# Patient Record
Sex: MALE | Race: WHITE
[De-identification: names, ages, dates, MRNs, and addresses within clinical notes are randomized per-mention and may not be internally consistent; named-entity substitution may affect disease eponyms.]

---

## 2018-06-27 ENCOUNTER — HOSPITAL ENCOUNTER (EMERGENCY)
Dept: HOSPITAL 25 - UCCORT | Age: 63
Discharge: HOME | End: 2018-06-27
Payer: MEDICAID

## 2018-06-27 VITALS — DIASTOLIC BLOOD PRESSURE: 89 MMHG | SYSTOLIC BLOOD PRESSURE: 146 MMHG

## 2018-06-27 DIAGNOSIS — M10.9: Primary | ICD-10-CM

## 2018-06-27 DIAGNOSIS — E11.65: ICD-10-CM

## 2018-06-27 DIAGNOSIS — Z87.891: ICD-10-CM

## 2018-06-27 LAB
BASOPHILS # BLD AUTO: 0 10^3/UL (ref 0–0.2)
EOSINOPHIL # BLD AUTO: 0.1 10^3/UL (ref 0–0.6)
HCT VFR BLD AUTO: 46 % (ref 42–52)
HGB BLD-MCNC: 16.3 G/DL (ref 14–18)
LYMPHOCYTES # BLD AUTO: 1.1 10^3/UL (ref 1–4.8)
MCH RBC QN AUTO: 32 PG (ref 27–31)
MCHC RBC AUTO-ENTMCNC: 36 G/DL (ref 31–36)
MCV RBC AUTO: 91 FL (ref 80–94)
MONOCYTES # BLD AUTO: 0.4 10^3/UL (ref 0–0.8)
NEUTROPHILS # BLD AUTO: 3.5 10^3/UL (ref 1.5–7.7)
NRBC # BLD AUTO: 0 10^3/UL
NRBC BLD QL AUTO: 0.2
PLATELET # BLD AUTO: 160 10^3/UL (ref 150–450)
RBC # BLD AUTO: 5.03 10^6/UL (ref 4–5.4)
URATE SERPL-MCNC: 4 MG/DL (ref 4.4–7.6)
WBC # BLD AUTO: 5.1 10^3/UL (ref 3.5–10.8)

## 2018-06-27 PROCEDURE — 80053 COMPREHEN METABOLIC PANEL: CPT

## 2018-06-27 PROCEDURE — 85025 COMPLETE CBC W/AUTO DIFF WBC: CPT

## 2018-06-27 PROCEDURE — 99212 OFFICE O/P EST SF 10 MIN: CPT

## 2018-06-27 PROCEDURE — 36415 COLL VENOUS BLD VENIPUNCTURE: CPT

## 2018-06-27 PROCEDURE — G0463 HOSPITAL OUTPT CLINIC VISIT: HCPCS

## 2018-06-27 PROCEDURE — 84550 ASSAY OF BLOOD/URIC ACID: CPT

## 2018-06-27 NOTE — UC
Lower Extremity/Ankle HPI





- HPI Summary


HPI Summary: 





left foot pain over the metatarsals dorsally. Atraumatic. No pcp. He is not 

aware of being a diabetic. No fever or chills. He quit tob in the 70s. He is 

retired. He had gout many years ago. It has been swollen and tender since 

friday and is getting worse. 





- History of Current Complaint


Chief Complaint: UCLowerExtremity


Stated Complaint: LEFT FOOT COMPLAINT


Time Seen by Provider: 06/27/18 08:56


Hx Obtained From: Patient, Family/Caretaker


Onset/Duration: Gradual Onset, Lasting Days


Severity Initially: Moderate


Severity Currently: Severe


Pain Intensity: 8


Aggravating Factor(s): Standing, Ambulation


Alleviating Factor(s): Rest, Elevation


Able to Bear Weight: No





- Allergies/Home Medications


Allergies/Adverse Reactions: 


 Allergies











Allergy/AdvReac Type Severity Reaction Status Date / Time


 


No Known Allergies Allergy   Verified 05/18/17 15:04














PMH/Surg Hx/FS Hx/Imm Hx


Previously Healthy: No - gout. prior smoker. 





- Surgical History


Surgical History: Yes


Surgery Procedure, Year, and Place: LEFT WRIST FX.  Rt SHOULDER - RTC AND BICEP 

INSERTION - [facility]





- Family History


Known Family History: Positive: None





- Social History


Occupation: Retired


Lives: With Family


Alcohol Use: Daily


Alcohol Amount: 1-2 BEERS PER DAY


Substance Use Type: None


Smoking Status (MU): Former Smoker


Amount Used/How Often: 1 PPD X <10 YEARS


When Did the Patient Quit Smoking/Using Tobacco: 1978





- Immunization History


Most Recent Tetanus Shot: UNKNOWN





Review of Systems


Constitutional: Negative


Musculoskeletal: Arthralgia


All Other Systems Reviewed And Are Negative: Yes





Physical Exam


Triage Information Reviewed: Yes


Appearance: Well-Appearing, No Pain Distress, Well-Nourished


Vital Signs: 


 Initial Vital Signs











Temp  98.7 F   06/27/18 08:23


 


Pulse  94   06/27/18 08:23


 


Resp  16   06/27/18 08:23


 


BP  146/89   06/27/18 08:23


 


Pulse Ox  99   06/27/18 08:23











Vital Signs Reviewed: Yes


Eyes: Positive: Conjunctiva Clear


ENT: Positive: Normal ENT inspection


Neck: Positive: Supple, Nontender, No Lymphadenopathy


Respiratory: Negative: Respiratory distress, Accessory muscle use


Cardiovascular: Positive: Brisk Capillary Refill, Other: - marli post tibialis 

pulses 2+


Abdomen Description: Negative: Distended


Musculoskeletal Exam: Other - swelling, pinkness, tenderness diffusely over the 

left foot. Mainly over the 1st metatarsal. Mild swelling and tenderness over 

the 1st MTP joint as well. No streaking or fluctuance.


Neurological: Positive: Alert, Muscle Tone Normal.  Negative: Fatigued


Psychological: Positive: Normal Response To Family, Age Appropriate Behavior


Skin: Negative: rashes





Lower Extremity Course/Dx





- Course


Course Of Treatment: No pcp, we wonder about undiagnosed DM. No signs of 

vascular compromise. This is most c/w with gout at this point. We will be 

careful with meds as we do not know his renal status. He agrees to f/u with new 

pcp. Pcp list given to pt and his wife. As he has no immediate follow up at 

this point we will also add antibiotics for the possibility of infection. 

Random BG is 280s. he may have DM. X ray does not show charcot foot or 

metastatic disease or pathologic fracture.





- Differential Dx/Diagnosis


Provider Diagnoses: gout.  foot pain and swelling.  possible foot cellulitis.





Discharge





- Sign-Out/Discharge


Documenting (check all that apply): Discharge/Admit/Transfer





- Discharge Plan


Condition: Good


Disposition: HOME


Prescriptions: 


Colchicine* [Colcrys*] 0.6 mg PO BID #2 tab


Naproxen [Naproxen 500 mg tab] 500 mg PO BID #10 tablet.


Sulfamethox/Trimethoprim DS* [Bactrim /160 TAB*] 1 tab PO BID #20 tab


Patient Education Materials:  Swollen Joint (ED), Arthralgia (ED), 

Metatarsalgia (DC)


Referrals: 


No Primary Care Phys,NOPCP [Primary Care Provider] - 


Additional Instructions: 


Go to the ED for any worsening symptoms. You need a primary care doctor. Use 

the paper I have given you to find one today. 





- Billing Disposition and Condition


Condition: GOOD


Disposition: Home

## 2018-06-27 NOTE — RAD
Indication: LEFT foot pain, swelling and numbness.



Comparison: No relevant prior exams available on the [facility] [place], [place] PACS for comparison.



Technique: AP, lateral, and oblique views LEFT foot. 



Report: Negative for fracture or malalignment. Polyarticular osteoarthritis moderate in

severity at the articulation between the bases of the first and second metatarsals and at

the first metatarsal phalangeal joint. Moderate Achilles tendon insertion bone spur and

soft tissue swelling at the level of the insertional segment of the Achilles tendon.

Additionally there is diffuse soft tissue swelling about the forefoot most prominent over

the dorsal and plantar aspects. No conspicuous foreign body or subcutaneous emphysema.



IMPRESSION: 

#.  Osteoarthritis 

#.  Correlate for potential acute component of insertional Achilles tendinopathy 

#.  Nonspecific forefoot soft tissue swelling

## 2018-06-28 NOTE — UC
- Progress Note


Progress Note: 





gluccose 330 --- he has uncontrolled diabetes and needs PCP ASAP -- I advise to 

go to emergency room for further labs to ensure the glucose not worsened as if 

it gets much higher can put patient in to diabetic coma 








Discharge





- Sign-Out/Discharge


Documenting (check all that apply): Discharge/Admit/Transfer





- Discharge Plan


Condition: Good


Disposition: HOME


Prescriptions: 


Colchicine* [Colcrys*] 0.6 mg PO BID #2 tab


Naproxen [Naproxen 500 mg tab] 500 mg PO BID #10 tablet.


Sulfamethox/Trimethoprim DS* [Bactrim /160 TAB*] 1 tab PO BID #20 tab


Patient Education Materials:  Arthralgia (ED), Swollen Joint (ED), 

Metatarsalgia (DC)


Referrals: 


No Primary Care Phys,NOPCP [Primary Care Provider] - 


Additional Instructions: 


Go to the ED for any worsening symptoms. You need a primary care doctor. Use 

the paper I have given you to find one today. 





- Billing Disposition and Condition


Condition: GOOD


Disposition: Home